# Patient Record
(demographics unavailable — no encounter records)

---

## 2024-12-11 NOTE — HISTORY OF PRESENT ILLNESS
[de-identified] : Since her last visit in May, patient reports persistent pain.  She is 8 months s/p arthroscopy and was able to attend PT post-operatively but was diagnosed with an unrelated illness that prevented her from focusing on her knee condition. She is unable to take NSAIDs due to GI issues

## 2024-12-11 NOTE — PHYSICAL EXAM
[Normal Coordination] : normal coordination [Normal Sensation] : normal sensation [Normal Mood and Affect] : normal mood and affect [Oriented] : oriented [Able to Communicate] : able to communicate [Well Developed] : well developed [Well Nourished] : well nourished [4___] : hamstring 4[unfilled]/5 [Left] : left knee [Lateral] : lateral [[place]] : skyline [AP Standing] : anteroposterior standing [] : no extensor lag [Mild tricompartmental OA medial narrowing] : Mild tricompartmental OA medial narrowing [[id number]] : flexion 100 degrees

## 2024-12-11 NOTE — DISCUSSION/SUMMARY
[de-identified] : We discussed formal PT, a home exercise program, ice therapy and the role of NSAIDS for the pain. These modalities will improve the patient's functionality in their activities of daily life.  Risks, benefits and contraindications were discussed.  The patient will follow up in 6 weeks or sooner as needed.

## 2025-06-11 NOTE — PHYSICAL EXAM
[NL (45)] : right lateral flexion 45 degrees [NL (80)] : right lateral rotation 80 degrees [5___] : right grasp 5[unfilled]/5 [Biceps 2+] : biceps 2+ [Triceps 2+] : triceps 2+ [Brachioradialis 2+] : brachioradialis 2+ [Straightening consistent with spasm] : Straightening consistent with spasm [Disc space narrowing] : Disc space narrowing [Foraminal narrowing] : Foraminal narrowing [] : full ROM with pain

## 2025-06-11 NOTE — HISTORY OF PRESENT ILLNESS
[de-identified] : Patient reports pain in the scapula that wraps under the armpit for the last few weeks. She attributes the pain to working out. She is unable to take NSAIDs

## 2025-06-11 NOTE — DISCUSSION/SUMMARY
[de-identified] : We discussed formal PT, a home exercise program, ice therapy and the role of NSAIDS (patient unable to take) for the pain. These modalities will improve the patient's functionality in their activities of daily life.  Risks, benefits and contraindications were discussed.  The patient will follow up in 6 weeks or sooner as needed.

## 2025-07-09 NOTE — HISTORY OF PRESENT ILLNESS
[de-identified] : Patient reports some improvement with about 3 weeks of PT and the muscle relaxer at night.